# Patient Record
(demographics unavailable — no encounter records)

---

## 2024-12-04 NOTE — HISTORY OF PRESENT ILLNESS
[de-identified] : fever and sore throat [FreeTextEntry6] : Grandfather brought pt in due to thinking the pt has strep Pt sibling was tested positive for strep Pt complains her throat was hurting last week and had some fever early last week but has resolved No fever during time of visit.

## 2024-12-04 NOTE — HISTORY OF PRESENT ILLNESS
[de-identified] : fever and sore throat [FreeTextEntry6] : Grandfather brought pt in due to thinking the pt has strep Pt sibling was tested positive for strep Pt complains her throat was hurting last week and had some fever early last week but has resolved No fever during time of visit.

## 2024-12-04 NOTE — DISCUSSION/SUMMARY
[FreeTextEntry1] : Likely had a viral illness, symptoms resolved without intervention however given close strep exposure will obtain throat culture. Further plan pending results.

## 2025-06-10 NOTE — DISCUSSION/SUMMARY
[FreeTextEntry1] : Assessment and Plan:   - **Suspected Pinworm Infection:** Based on the patient's description of small, white, thread-like objects in the stool and associated perianal itching, a diagnosis of pinworm infection is suspected. This is a common condition in school-aged children and is consistent with the patient's symptoms.     - Therapeutic Interventions: Prescribe over-the-counter Ronald's Pinworm Medicine. Dosage for the patient: 2 teaspoonfuls (10 mL). Treatment to be repeated after two weeks.      - Patient Education: Instruct the family that all household members should be treated simultaneously, even if asymptomatic, due to the contagious nature of pinworms.      - Follow-Up: No immediate follow-up required unless symptoms persist or worsen after completing the treatment regimen.      - Lifestyle Modifications: Emphasize good hand hygiene and regular cleaning of shared spaces to prevent re-infection.      - School Attendance: Patient can continue normal activities, including attending school and planned trips.

## 2025-06-10 NOTE — HISTORY OF PRESENT ILLNESS
[de-identified] : Small white, moving objects observed in stool and perianal itching [FreeTextEntry6] : Nahed presents with her parent due to concerns about possible pinworm infection. The patient reports that on a couple of recent occasions after defecation, she noticed small white, thread-like objects in her stool. She describes these objects as moving, although her parent did not observe the movement. Additionally, Nahed has been experiencing itchiness in the perianal area.

## 2025-06-14 NOTE — DISCUSSION/SUMMARY
[FreeTextEntry1] : Assessment and Plan:   - **Abdominal Pain Secondary to Pinworm Treatment:** The patient's abdominal pain appears to be related to the recent pinworm treatment. While it's unusual for the medication to cause prolonged symptoms, the timing suggests a possible connection. The pain could also be due to the body's response to the dying parasites.     - Maintain a bland diet for the next few days      - Monitor for worsening symptoms such as blood in stools, diarrhea, mucus in stools, vomiting, sweating, or fever      - Consider over-the-counter antacids like Mylanta or Pepto Bismol for symptom relief      - Avoid pain medications like Tylenol or Motrin as they may exacerbate symptoms      - Prescribe Zofran (ondansetron) orally disintegrating tablets for nausea if needed      - Continue with the planned second dose of pinworm medication in two weeks      - Follow up if symptoms persist or worsen by Monday or Tuesday of the coming week      - Consider further imaging (sonogram or X-ray) if symptoms persist beyond 5-7 days    - **Pinworm Infection (Treated):** The patient was diagnosed with pinworm infection and treated with appropriate medication. No pinworms have been observed since Wednesday, indicating likely successful treatment.     - Continue to monitor for recurrence of pinworms      - Administer second dose of pinworm medication as scheduled in two weeks      - Educate family on hygiene measures to prevent re-infection

## 2025-06-14 NOTE — PHYSICAL EXAM
[Clear to Auscultation Bilaterally] : clear to auscultation bilaterally [Soft] : soft [Normal Bowel Sounds] : normal bowel sounds [NL] : warm, clear [Tender] : nontender [Distended] : nondistended [Hepatosplenomegaly] : no hepatosplenomegaly [Tenderness with Palpation] : no tenderness with palpation [McBurney's point tenderness] : no McBurney's point tenderness [Rebound tenderness] : no rebound tenderness [Psoas Sign Positive] : psoas sign negative [Obturator Sign Positive] : obturator sign negative

## 2025-06-14 NOTE — HISTORY OF PRESENT ILLNESS
[de-identified] : abdominal pain, nauseous, lack of appetite  [FreeTextEntry6] : Nahed was seen on Tuesday (6/11/2025) for pinworms and was given medication that evening. On Wednesday night, she began complaining of stomachache, which was mild initially. The pain increased in severity on Thursday and Friday, with Friday being the most severe. She has been complaining of pain frequently, including throughout the night. This morning (6/14/2025), Nahed reported feeling nauseous. She has had no diarrhea or vomiting. Her appetite has been poor, with minimal food intake yesterday and no breakfast today. Nahed had a bowel movement today, which she described as 'a little soft' but otherwise normal. She has not seen any pinworms since Wednesday, whereas before treatment she was seeing them daily. The pain is localized to the middle abdominal area. The family members also took the pinworm medication, but none are experiencing symptoms.

## 2025-07-29 NOTE — DISCUSSION/SUMMARY
[FreeTextEntry1] : change to albendazole repeat stool testing  reopeat treatmnt in 2 weeks follow up in 3 weeks

## 2025-07-29 NOTE — HISTORY OF PRESENT ILLNESS
[de-identified] : complains of icthing in anal region  [FreeTextEntry6] : was treated last month and also had a very high calprotectin level  non bloody itching